# Patient Record
Sex: FEMALE | Race: WHITE | NOT HISPANIC OR LATINO | Employment: FULL TIME | URBAN - METROPOLITAN AREA
[De-identification: names, ages, dates, MRNs, and addresses within clinical notes are randomized per-mention and may not be internally consistent; named-entity substitution may affect disease eponyms.]

---

## 2020-07-04 ENCOUNTER — HOSPITAL ENCOUNTER (EMERGENCY)
Facility: CLINIC | Age: 50
Discharge: HOME | End: 2020-07-04
Attending: PHYSICIAN ASSISTANT | Admitting: PHYSICIAN ASSISTANT
Payer: COMMERCIAL

## 2020-07-04 VITALS
OXYGEN SATURATION: 100 % | SYSTOLIC BLOOD PRESSURE: 154 MMHG | RESPIRATION RATE: 20 BRPM | TEMPERATURE: 98.4 F | DIASTOLIC BLOOD PRESSURE: 110 MMHG

## 2020-07-04 DIAGNOSIS — T23.209A SUPERFICIAL PARTIAL THICKNESS BURN OF HAND: ICD-10-CM

## 2020-07-04 PROCEDURE — 16020 DRESS/DEBRID P-THICK BURN S: CPT

## 2020-07-04 PROCEDURE — 25000132 ZZH RX MED GY IP 250 OP 250 PS 637: Performed by: PHYSICIAN ASSISTANT

## 2020-07-04 PROCEDURE — 99283 EMERGENCY DEPT VISIT LOW MDM: CPT | Mod: 25

## 2020-07-04 RX ORDER — IBUPROFEN 600 MG/1
600 TABLET, FILM COATED ORAL ONCE
Status: COMPLETED | OUTPATIENT
Start: 2020-07-04 | End: 2020-07-04

## 2020-07-04 RX ORDER — HYDROCODONE BITARTRATE AND ACETAMINOPHEN 5; 325 MG/1; MG/1
1 TABLET ORAL EVERY 6 HOURS PRN
Qty: 8 TABLET | Refills: 0 | Status: SHIPPED | OUTPATIENT
Start: 2020-07-04 | End: 2020-07-07

## 2020-07-04 RX ORDER — HYDROCODONE BITARTRATE AND ACETAMINOPHEN 5; 325 MG/1; MG/1
1 TABLET ORAL ONCE
Status: COMPLETED | OUTPATIENT
Start: 2020-07-04 | End: 2020-07-04

## 2020-07-04 RX ADMIN — IBUPROFEN 600 MG: 600 TABLET ORAL at 17:04

## 2020-07-04 RX ADMIN — HYDROCODONE BITARTRATE AND ACETAMINOPHEN 1 TABLET: 5; 325 TABLET ORAL at 17:04

## 2020-07-04 ASSESSMENT — ENCOUNTER SYMPTOMS: WOUND: 1

## 2020-07-04 NOTE — ED PROVIDER NOTES
History     Chief Complaint:  Burn    HPI   Daphnie Sow is a 49 year old female who presents for evaluation of a burn. Patient states that yesterday she was holding a pot of boiling water when the pot slipped and poured hot water over her left arm, wrist, and hand. She put antiseptic ointment on the burn but states the burn continues to swell and blister. She has tried ibuprofen to no relief of her pain.  She reports normal sensation.    Allergies:  Nicotine     Medications:    Citalopram     Past Medical History:    Anxiety  Tobacco use disorder    Past Surgical History:    Hysterectomy  Appendectomy  Tonsillectomy and adenoidectomy  Cyst removal  Nasal septum surgery  Mandible surgery    Family History:    Alcohol abuse - father  Heart disease - father  Migraines - mother    Social History:  Smoking status: everyday smoker  Alcohol use: yes  Drug use: no  The patient presents to the emergency department by herself.  PCP: [facility], [place]  Marital Status:       Review of Systems   Skin: Positive for wound.   All other systems reviewed and are negative.    Physical Exam     Patient Vitals for the past 24 hrs:   BP Temp Temp src Heart Rate Resp SpO2   07/04/20 1655 (!) 154/110 98.4  F (36.9  C) Oral 88 20 100 %     Physical Exam  General: Alert and cooperative with exam. Resting comfortably on gurney  Head:  Scalp is NC/AT  Eyes:  No scleral icterus, PERRL with normal tracking  ENT:  The external nose and ears are normal.   Neck:  Normal range of motion without rigidity.  Chest:  Normal effort.  No tachypnea or distress.  Skin:  There is large ruptured blister as shown below with several other smaller ruptured blisters consistent with superficial partial-thickness burn of the left hand and distal forearm with larger area of surrounding first-degree burn.  Approximately 2% total BSA.  Skin blanches normally.  No circumferential burns.  Neuro: No gross motor deficits.  Normal sensation  throughout hand and arm.  Normal strength in hand and fingers throughout radial, median, ulnar nerve distributions.    No facial asymmetry.  GCS: 15.   Psych: Awake. Alert. Normal affect. Appropriate interactions.                  Emergency Department Course   Interventions:  170 ibuprofen 600 mg Oral  170 Norco 1 tablet Oral    Emergency Department Course:  Nursing notes and vitals reviewed. () I performed an exam of the patient as documented above.     Medicine administered as documented above.     Findings and plan explained to the Patient. Patient discharged home with instructions regarding supportive care, medications, and reasons to return. The importance of close follow-up was reviewed. The patient was prescribed Norco.    Impression & Plan      Medical Decision Makin-year-old female presents with burn to left hand and forearm from pot of water yesterday.  History and records reviewed.  On exam there is an area of superficial partial-thickness burn with larger area of first-degree burn.  No circumferential burns.  No indication for referral to burn center.  No signs of infection.  CMS intact distally.  Tetanus is up-to-date.  Plan will be pain control,  bacitracin and dressings, follow-up with PCP or burn center in 2 to 3 days for wound recheck.  Discussed watch for signs of infection and these were provided in writing.    Diagnosis:    ICD-10-CM    1. Superficial partial thickness burn of hand  T23.209A        Disposition:  discharged to home    Discharge Medications:  New Prescriptions    HYDROCODONE-ACETAMINOPHEN (NORCO) 5-325 MG TABLET    Take 1 tablet by mouth every 6 hours as needed for severe pain       Cameron Angeles  2020   [facility] EMERGENCY DEPARTMENT  Scribe Disclosure:  I, Cameron Angeles, am serving as a scribe at 4:51 PM on 2020 to document services personally performed by Adrien Tomas PA based on my observations and the provider's statements to me.         Adrien Tomas PA-C  07/04/20 1736

## 2020-07-04 NOTE — ED TRIAGE NOTES
Left wrist area burn from pouring boiling water on it while making a pasta salad yesterday.  Patient alert and oriented x3.  Airway, breathing and circulation intact.

## 2020-07-04 NOTE — ED AVS SNAPSHOT
[facility] Emergency Department  [address]  [facility] [address]  Phone:  [phone]  Fax:  [phone]                                    Daphnie Sow   MRN: [id number]    Department:  [facility] Emergency Department   Date of Visit:  7/4/2020           After Visit Summary Signature Page    I have received my discharge instructions, and my questions have been answered. I have discussed any challenges I see with this plan with the nurse or doctor.    ..........................................................................................................................................  Patient/Patient Representative Signature      ..........................................................................................................................................  Patient Representative Print Name and Relationship to Patient    ..................................................               ................................................  Date                                   Time    ..........................................................................................................................................  Reviewed by Signature/Title    ...................................................              ..............................................  Date                                               Time          22EPIC Rev 08/18

## 2022-07-31 ENCOUNTER — HOSPITAL ENCOUNTER (EMERGENCY)
Facility: CLINIC | Age: 52
Discharge: HOME | End: 2022-07-31
Attending: EMERGENCY MEDICINE | Admitting: EMERGENCY MEDICINE
Payer: COMMERCIAL

## 2022-07-31 VITALS
SYSTOLIC BLOOD PRESSURE: 144 MMHG | HEART RATE: 88 BPM | RESPIRATION RATE: 20 BRPM | TEMPERATURE: 98.2 F | OXYGEN SATURATION: 95 % | DIASTOLIC BLOOD PRESSURE: 95 MMHG

## 2022-07-31 DIAGNOSIS — S91.311A FOOT LACERATION, RIGHT, INITIAL ENCOUNTER: ICD-10-CM

## 2022-07-31 PROCEDURE — 12001 RPR S/N/AX/GEN/TRNK 2.5CM/<: CPT

## 2022-07-31 PROCEDURE — 250N000009 HC RX 250: Performed by: EMERGENCY MEDICINE

## 2022-07-31 PROCEDURE — 99283 EMERGENCY DEPT VISIT LOW MDM: CPT

## 2022-07-31 RX ADMIN — Medication: at 00:15

## 2022-07-31 ASSESSMENT — ENCOUNTER SYMPTOMS: WOUND: 1

## 2022-07-31 NOTE — ED TRIAGE NOTES
Pt arrives to ED with R foot lac from cutting it on metal siding. Pt is intoxicated. Tetanus 5 years ago.  washed out wound and tried to use butterfly strips to keep it together. CMS intact

## 2022-07-31 NOTE — ED PROVIDER NOTES
History     Chief Complaint:  Laceration     HPI   Daphnie Sow is a 51 year old female who presents with laceration. The patient reports that she cut her right foot on steel siding. Her  washed it with hydrogen peroxide and attempted to apply butterfly strips. She is having pain and is visibly uncomfortable. Last Tdap in 7/2017 per [facility].    Review of Systems   Skin: Positive for wound.   All other systems reviewed and are negative.    Allergies:  No known drug allergies     Medications:  Citalopram  Buspirone    Past Medical History:     Generalized anxiety disorder    Past Surgical History:    Hysterectomy  Appendectomy  Tonsillectomy  Adenoidectomy  Nasal septum surgery  Mandible surgery  Excision of right breast cyst     Family History:    Father- alcohol abuse, heart disease  Mother- migraines    Social History:  The patient presents to the ED with   PCP: Radhika Sandhu MD    Physical Exam     Patient Vitals for the past 24 hrs:   BP Temp Temp src Pulse Resp SpO2   07/31/22 0006 144/95 98.2  F (36.8  C) Temporal 88 20 95 %     Physical Exam  Nursing note and vitals reviewed.  Constitutional: Cooperative.   Cardiovascular: Normal rate, regular rhythm. 2+ right DP pulse.  Pulmonary/Chest: Effort normal  Musculoskeletal: Normal range of motion of RLE.   Neurological: Alert. Strength and sensation in RLE normal.   Skin: Skin is warm and dry. No rash noted. Laceration to the dorsum of the right foot.   Psychiatric: Normal mood and affect.     Emergency Department Course     Procedures    Laceration Repair      Procedure: Laceration Repair    Indication: Laceration    Consent: Verbal    Location: Right Foot     Length: 2.4 cm    Preparation: Irrigation with Sterile Saline.    Anesthesia/Sedation: Topical -LET and Lidocaine with Epinephrine - 1%      Treatment/Exploration: Wound explored, no foreign bodies found     Closure: The wound was closed with one layer. Skin/superficial layer was  closed with 5 x 4-0 Nylon using Interrupted sutures.     Patient Status: The patient tolerated the procedure well: Yes. There were no complications.    Reviewed:  I reviewed nursing notes, vitals, past medical history, Care Everywhere and [facility]    Assessments:  0023 I obtained history and examined the patient as noted above.   0105 Laceration repair.     Interventions:  Medications   lido-EPINEPHrine-tetracaine (LET) topical gel GEL ( Topical Given 7/31/22 0015)     Disposition:  The patient was discharged to home.     Impression & Plan     Medical Decision Making:  Daphnie Sow is a 51 year old female who presents for evaluation of a laceration to the right foot.  The wound was carefully evaluated and explored.  The laceration was closed with sutures as noted above.  There is no evidence of muscular, tendon, or bony damage with this laceration.  No signs of foreign body.  Possible complications (infection, scarring) were reviewed with the patient.  Follow up with primary care as noted in the discharge section.     Diagnosis:    ICD-10-CM    1. Foot laceration, right, initial encounter  S91.311A      Scribe Disclosure:  I, Jasmine Wong, am serving as a scribe at 12:32 AM on 7/31/2022 to document services personally performed by Esequiel Rojas MD based on my observations and the provider's statements to me.            Esequiel Rojas MD  07/31/22 0133

## 2022-11-24 ENCOUNTER — APPOINTMENT (OUTPATIENT)
Dept: CT IMAGING | Facility: CLINIC | Age: 52
End: 2022-11-24
Attending: EMERGENCY MEDICINE
Payer: COMMERCIAL

## 2022-11-24 ENCOUNTER — HOSPITAL ENCOUNTER (EMERGENCY)
Facility: CLINIC | Age: 52
Discharge: HOME | End: 2022-11-24
Attending: EMERGENCY MEDICINE | Admitting: EMERGENCY MEDICINE
Payer: COMMERCIAL

## 2022-11-24 VITALS
SYSTOLIC BLOOD PRESSURE: 130 MMHG | HEART RATE: 91 BPM | OXYGEN SATURATION: 95 % | TEMPERATURE: 97.8 F | RESPIRATION RATE: 16 BRPM | DIASTOLIC BLOOD PRESSURE: 79 MMHG

## 2022-11-24 DIAGNOSIS — S01.21XA LACERATION OF NOSE, INITIAL ENCOUNTER: ICD-10-CM

## 2022-11-24 DIAGNOSIS — F10.920 ALCOHOLIC INTOXICATION WITHOUT COMPLICATION (H): ICD-10-CM
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
## 2022-11-24 DIAGNOSIS — E04.1 THYROID NODULE: ICD-10-CM

## 2022-11-24 DIAGNOSIS — S01.81XA FACIAL LACERATION, INITIAL ENCOUNTER: ICD-10-CM

## 2022-11-24 LAB
ANION GAP SERPL CALCULATED.3IONS-SCNC: 14 MMOL/L (ref 7–15)
BASOPHILS # BLD AUTO: 0.1 10E3/UL (ref 0–0.2)
BASOPHILS NFR BLD AUTO: 1 %
BUN SERPL-MCNC: 6.9 MG/DL (ref 6–20)
CALCIUM SERPL-MCNC: 9.3 MG/DL (ref 8.6–10)
CHLORIDE SERPL-SCNC: 110 MMOL/L (ref 98–107)
CREAT SERPL-MCNC: 0.52 MG/DL (ref 0.51–0.95)
DEPRECATED HCO3 PLAS-SCNC: 22 MMOL/L (ref 22–29)
EOSINOPHIL # BLD AUTO: 0.1 10E3/UL (ref 0–0.7)
EOSINOPHIL NFR BLD AUTO: 1 %
ERYTHROCYTE [DISTWIDTH] IN BLOOD BY AUTOMATED COUNT: 12 % (ref 10–15)
ETHANOL SERPL-MCNC: 0.3 G/DL
GFR SERPL CREATININE-BSD FRML MDRD: >90 ML/MIN/1.73M2
GLUCOSE SERPL-MCNC: 105 MG/DL (ref 70–99)
HCT VFR BLD AUTO: 46.5 % (ref 35–47)
HGB BLD-MCNC: 15.2 G/DL (ref 11.7–15.7)
IMM GRANULOCYTES # BLD: 0 10E3/UL
IMM GRANULOCYTES NFR BLD: 0 %
LYMPHOCYTES # BLD AUTO: 2.4 10E3/UL (ref 0.8–5.3)
LYMPHOCYTES NFR BLD AUTO: 28 %
MCH RBC QN AUTO: 33.2 PG (ref 26.5–33)
MCHC RBC AUTO-ENTMCNC: 32.7 G/DL (ref 31.5–36.5)
MCV RBC AUTO: 102 FL (ref 78–100)
MONOCYTES # BLD AUTO: 0.8 10E3/UL (ref 0–1.3)
MONOCYTES NFR BLD AUTO: 10 %
NEUTROPHILS # BLD AUTO: 5 10E3/UL (ref 1.6–8.3)
NEUTROPHILS NFR BLD AUTO: 60 %
NRBC # BLD AUTO: 0 10E3/UL
NRBC BLD AUTO-RTO: 0 /100
PLATELET # BLD AUTO: 211 10E3/UL (ref 150–450)
POTASSIUM SERPL-SCNC: 4 MMOL/L (ref 3.4–5.3)
RBC # BLD AUTO: 4.58 10E6/UL (ref 3.8–5.2)
SODIUM SERPL-SCNC: 146 MMOL/L (ref 136–145)
WBC # BLD AUTO: 8.4 10E3/UL (ref 4–11)

## 2022-11-24 PROCEDURE — 36415 COLL VENOUS BLD VENIPUNCTURE: CPT | Performed by: EMERGENCY MEDICINE

## 2022-11-24 PROCEDURE — 70450 CT HEAD/BRAIN W/O DYE: CPT

## 2022-11-24 PROCEDURE — 99285 EMERGENCY DEPT VISIT HI MDM: CPT | Mod: 25

## 2022-11-24 PROCEDURE — 12004 RPR S/N/AX/GEN/TRK7.6-12.5CM: CPT

## 2022-11-24 PROCEDURE — 72125 CT NECK SPINE W/O DYE: CPT

## 2022-11-24 PROCEDURE — 70486 CT MAXILLOFACIAL W/O DYE: CPT

## 2022-11-24 PROCEDURE — 85025 COMPLETE CBC W/AUTO DIFF WBC: CPT | Performed by: EMERGENCY MEDICINE

## 2022-11-24 PROCEDURE — 82077 ASSAY SPEC XCP UR&BREATH IA: CPT | Performed by: EMERGENCY MEDICINE

## 2022-11-24 PROCEDURE — 80048 BASIC METABOLIC PNL TOTAL CA: CPT | Performed by: EMERGENCY MEDICINE

## 2022-11-24 ASSESSMENT — ACTIVITIES OF DAILY LIVING (ADL)
ADLS_ACUITY_SCORE: 35
ADLS_ACUITY_SCORE: 33

## 2022-11-24 ASSESSMENT — ENCOUNTER SYMPTOMS
NAUSEA: 0
VOMITING: 0
WOUND: 1
HEADACHES: 0

## 2022-11-24 NOTE — ED TRIAGE NOTES
Dancing to giovanni randall  on floor with fluffy socks. Fell on floor. Denies anticoagulants or LOC. Large lac to forehead and small lac to bridge of nose.  Attempted to order CT scan for pt and pt refused.

## 2022-11-24 NOTE — ED PROVIDER NOTES
History   Chief Complaint:  Fall    The history is provided by the patient and the spouse. The history is limited by the condition of the patient.      Daphnie Sow is a 52 year old female with history of BRANDYN who presents s/p a fall.  Patient states she sustained a wound to her forehead  while dancing on a hardwood floor while wearing socks. Denies chest pain, headache, vision change, neck pain, LOC, nausea, vomiting, and other symptoms.  She drank 3 glasses of wine tonight. Her last tetanus shot was 2017.  accompanying her.  No reported seizure activity. No history anticoagulation.     Review of Systems   Eyes: Negative for visual disturbance.   Cardiovascular: Negative for chest pain.   Gastrointestinal: Negative for nausea and vomiting.   Skin: Positive for wound.   Neurological: Negative for syncope and headaches.   All other systems reviewed and are negative.    Allergies:  No known drug allergies      Medications:  Citalopram  Buspirone     Past Medical History:     Generalized anxiety disorder     Past Surgical History:    Hysterectomy  Appendectomy  Tonsillectomy  Adenoidectomy  Nasal septum surgery  Mandible surgery  Excision of right breast cyst     Family History:    Father- alcohol abuse, heart disease  Mother- migraines    Social History:  The patient presents to the ED with    PCP: [facility], [place]     Physical Exam     Patient Vitals for the past 24 hrs:   BP Temp Temp src Pulse Resp SpO2   11/24/22 0330 111/73 -- -- 83 -- 95 %   11/24/22 0314 112/76 -- -- 84 -- 96 %   11/24/22 0259 123/76 -- -- 88 -- 97 %   11/24/22 0212 (!) 134/110 97.8  F (36.6  C) Temporal 99 16 100 %       Physical Exam  General: Alert. Appears comfortable  Head:  The scalp iw without trauma; 8cm full thickness laceration of L. Frontal region, no bony/arterial involvement. No FB visualized.   Eyes:  Sclera white; Pupils are equal and round. EOMI. No zygomatic arch tenderness  ENT:    External ears  normal.  No hemotympanum.      External nares normal. 1cm partial thickness laceration overlying nasal bridge; No septal hematoma, deviated septum (baseline per patient).    Neck:  No midline tenderness or pain with full ROM.  CV:  Rate as above with regular rhythm  2/2 radial and dorsal pedal pulses  Resp:  Breath sounds clear and equal bilaterally    Non-labored, no retractions or accessory muscle use  GI:  Abdomen soft, non-tender, non-distended    No rebound tenderness or guarding  MSK:  No midline tenderness or bony step-off    No deformity    Moves all extremities equally and symmetrically  Skin:  No rash or lesions noted.  Neuro:   No apparent deficit.    Strength 5/5 x4.  Sensation intact x4.     GCS: 14  Psych:  Slurring words, cooperative. Appears intoxicated      Emergency Department Course     Imaging:  Cervical spine CT w/o contrast   Final Result   IMPRESSION:   HEAD CT:   1.  Frontal scalp contusion and laceration. No acute intracranial hemorrhage or calvarial fracture.      FACIAL BONE CT:   1.  No facial fracture.      CERVICAL SPINE CT:   1.  No fracture or posttraumatic subluxation.   2.  9 mm right thyroid nodule.      REFERENCE:   Carson SANDERSON et al. Managing Incidental Thyroid Nodules Detected on Imaging: White Paper of the ACR Incidental Thyroid Findings Committee. JACR 2015; 12:143-150.      Incidental thyroid nodule detected on CT or MRI without suspicious findings. Applies to general population without limited life expectancy or significant comorbidities.      Age greater than or equal to 35 years   Less than 1.5 cm: No further evaluation.   Greater than or equal to 1.5 cm: Evaluate with thyroid ultrasound.      CT Facial Bones without Contrast   Final Result   IMPRESSION:   HEAD CT:   1.  Frontal scalp contusion and laceration. No acute intracranial hemorrhage or calvarial fracture.      FACIAL BONE CT:   1.  No facial fracture.      CERVICAL SPINE CT:   1.  No fracture or posttraumatic  subluxation.   2.  9 mm right thyroid nodule.      REFERENCE:   Carson OBREGONK et al. Managing Incidental Thyroid Nodules Detected on Imaging: White Paper of the ACR Incidental Thyroid Findings Committee. JACR 2015; 12:143-150.      Incidental thyroid nodule detected on CT or MRI without suspicious findings. Applies to general population without limited life expectancy or significant comorbidities.      Age greater than or equal to 35 years   Less than 1.5 cm: No further evaluation.   Greater than or equal to 1.5 cm: Evaluate with thyroid ultrasound.      Head CT w/o contrast   Final Result   IMPRESSION:   HEAD CT:   1.  Frontal scalp contusion and laceration. No acute intracranial hemorrhage or calvarial fracture.      FACIAL BONE CT:   1.  No facial fracture.      CERVICAL SPINE CT:   1.  No fracture or posttraumatic subluxation.   2.  9 mm right thyroid nodule.      REFERENCE:   Carson JK et al. Managing Incidental Thyroid Nodules Detected on Imaging: White Paper of the ACR Incidental Thyroid Findings Committee. JACR 2015; 12:143-150.      Incidental thyroid nodule detected on CT or MRI without suspicious findings. Applies to general population without limited life expectancy or significant comorbidities.      Age greater than or equal to 35 years   Less than 1.5 cm: No further evaluation.   Greater than or equal to 1.5 cm: Evaluate with thyroid ultrasound.        Report per radiology    Laboratory:  Labs Ordered and Resulted from Time of ED Arrival to Time of ED Departure   BASIC METABOLIC PANEL - Abnormal       Result Value    Sodium 146 (*)     Potassium 4.0      Chloride 110 (*)     Carbon Dioxide (CO2) 22      Anion Gap 14      Urea Nitrogen 6.9      Creatinine 0.52      Calcium 9.3      Glucose 105 (*)     GFR Estimate >90     ETHYL ALCOHOL LEVEL - Abnormal    Alcohol ethyl 0.30 (*)    CBC WITH PLATELETS AND DIFFERENTIAL - Abnormal    WBC Count 8.4      RBC Count 4.58      Hemoglobin 15.2      Hematocrit 46.5        (*)     MCH 33.2 (*)     MCHC 32.7      RDW 12.0      Platelet Count 211      % Neutrophils 60      % Lymphocytes 28      % Monocytes 10      % Eosinophils 1      % Basophils 1      % Immature Granulocytes 0      NRBCs per 100 WBC 0      Absolute Neutrophils 5.0      Absolute Lymphocytes 2.4      Absolute Monocytes 0.8      Absolute Eosinophils 0.1      Absolute Basophils 0.1      Absolute Immature Granulocytes 0.0      Absolute NRBCs 0.0          Procedures     Laceration Repair      LACERATION:  A full thickness 7 cm laceration.    LOCATION:  Frontal scalp    FUNCTION:  Distally sensation/motor/arterial are intact.    ANESTHESIA:  Lidocaine 1% with epi; 3cc    PREPARATION:  Jacob clens, irrigation                            DEBRIDEMENT:  No debridement. Wound explored.       CLOSURE:  Wound was closed with 10 x 5.0 FAST ABSORBING GUT sutures.       Laceration Repair      LACERATION:  A simple 1 cm laceration.    LOCATION:  Nasal bridge    FUNCTION:  Distally sensation/motor/arterial are intact.    ANESTHESIA:  none    PREPARATION:  Jacob clens, irrigation    DEBRIDEMENT: none.    CLOSURE:  Wound was closed with dermabond      Emergency Department Course:  Reviewed:  I reviewed nursing notes, vitals, past medical history and Care Everywhere    Assessments:  0348 I obtained history and examined the patient as noted above.    I rechecked the patient and explained findings.   0530   I spoke to patient's mother Mar regarding patient presentation.  We discussed daughter is still clinically intoxicated though plans to take Uber ride home with her spouse. We reviewed closed head injury precautions as well as wound precautions. Mar is currently at patient's home.    Interventions:  Medications - No data to display      Disposition:  Discharged    Impression & Plan   Medical Decision Making:  Patient is a 53 yo female presenting s/p a reported fall.  She is acutely intoxicated on arrival with a large frontal scalp  "laceration that was repaired as noted herein.  Risks of scarring/infection reviewed with patient/spouse and patient mother over phone given clinical intoxication of patient.  Also noted to have a small nasal bridge laceration which was repaired with dermabond. Tetanus UTD.  Patient underwent CT head/C-spine and face which fortunately are without traumatic injury. Incidental thyroid nodule identified and discussed with patient/spouse and patient's mother. No reported syncope, chest pain, seizure or other concerning symptoms reported.  Strong suspicion fall was likely 2/2 to alcohol intoxication.  Patient without other areas of injury on exam and I do not feel further emergent workup is warranted. She is ambulatory and requesting discharge. Given still clinically intoxicated, contacted patient's mother Mar over the phone.  She contracts for patient's safety and we reviewed patient to be managed as sustaining a closed head injury. Discussed secondary impact syndrome as well as \"red flags\" which would necessitate return to the ED.  Discussed wound precauations and need for PCP f/u in 1 week for wound reevaluation. Tylenol/motrin PRN.      Diagnosis:    ICD-10-CM    1. Facial laceration, initial encounter  S01.81XA       2. Laceration of nose, initial encounter  S01.21XA       3. Fall, initial encounter  W19.XXXA       4. Alcoholic intoxication without complication (H)  F10.920       5. Thyroid nodule  E04.1           Discharge Medications:  New Prescriptions    No medications on file       Scribe Disclosure:  José ENRIQUEZ, am serving as a scribe at 3:48 AM on 11/24/2022 to document services personally performed by Marlene Rodríguez DO based on my observations and the provider's statements to me.         Marlene Rodríguez DO  11/24/22 2052    "

## 2022-12-07 ENCOUNTER — DOCUMENTATION ONLY (OUTPATIENT)
Dept: HOME HEALTH SERVICES | Facility: CLINIC | Age: 52
End: 2022-12-07

## 2022-12-30 ENCOUNTER — DOCUMENTATION ONLY (OUTPATIENT)
Dept: HOME HEALTH SERVICES | Facility: CLINIC | Age: 52
End: 2022-12-30

## 2023-04-01 ENCOUNTER — HEALTH MAINTENANCE LETTER (OUTPATIENT)
Age: 53
End: 2023-04-01

## 2024-06-02 ENCOUNTER — HEALTH MAINTENANCE LETTER (OUTPATIENT)
Age: 54
End: 2024-06-02

## 2024-10-05 ENCOUNTER — APPOINTMENT (OUTPATIENT)
Dept: CT IMAGING | Facility: CLINIC | Age: 54
End: 2024-10-05
Attending: EMERGENCY MEDICINE
Payer: COMMERCIAL

## 2024-10-05 ENCOUNTER — HOSPITAL ENCOUNTER (EMERGENCY)
Facility: CLINIC | Age: 54
Discharge: HOME | End: 2024-10-05
Attending: EMERGENCY MEDICINE | Admitting: EMERGENCY MEDICINE
Payer: COMMERCIAL

## 2024-10-05 VITALS
TEMPERATURE: 98 F | WEIGHT: 140 LBS | BODY MASS INDEX: 23.32 KG/M2 | OXYGEN SATURATION: 99 % | HEIGHT: 65 IN | SYSTOLIC BLOOD PRESSURE: 148 MMHG | DIASTOLIC BLOOD PRESSURE: 92 MMHG | RESPIRATION RATE: 18 BRPM | HEART RATE: 71 BPM

## 2024-10-05 DIAGNOSIS — R42 VERTIGO: ICD-10-CM

## 2024-10-05 LAB
ANION GAP SERPL CALCULATED.3IONS-SCNC: 17 MMOL/L (ref 7–15)
APTT PPP: 25 SECONDS (ref 22–38)
BASOPHILS # BLD AUTO: 0 10E3/UL (ref 0–0.2)
BASOPHILS NFR BLD AUTO: 0 %
BUN SERPL-MCNC: 9.9 MG/DL (ref 6–20)
CALCIUM SERPL-MCNC: 9.4 MG/DL (ref 8.8–10.4)
CHLORIDE SERPL-SCNC: 100 MMOL/L (ref 98–107)
CREAT SERPL-MCNC: 0.52 MG/DL (ref 0.51–0.95)
EGFRCR SERPLBLD CKD-EPI 2021: >90 ML/MIN/1.73M2
EOSINOPHIL # BLD AUTO: 0 10E3/UL (ref 0–0.7)
EOSINOPHIL NFR BLD AUTO: 0 %
ERYTHROCYTE [DISTWIDTH] IN BLOOD BY AUTOMATED COUNT: 11.8 % (ref 10–15)
GLUCOSE BLDC GLUCOMTR-MCNC: 158 MG/DL (ref 70–99)
GLUCOSE SERPL-MCNC: 151 MG/DL (ref 70–99)
HCG SERPL QL: NEGATIVE
HCO3 SERPL-SCNC: 19 MMOL/L (ref 22–29)
HCT VFR BLD AUTO: 45.6 % (ref 35–47)
HGB BLD-MCNC: 15.4 G/DL (ref 11.7–15.7)
HOLD SPECIMEN: NORMAL
HOLD SPECIMEN: NORMAL
IMM GRANULOCYTES # BLD: 0.1 10E3/UL
IMM GRANULOCYTES NFR BLD: 0 %
INR PPP: 1.05 (ref 0.85–1.15)
LYMPHOCYTES # BLD AUTO: 3.7 10E3/UL (ref 0.8–5.3)
LYMPHOCYTES NFR BLD AUTO: 27 %
MCH RBC QN AUTO: 33 PG (ref 26.5–33)
MCHC RBC AUTO-ENTMCNC: 33.8 G/DL (ref 31.5–36.5)
MCV RBC AUTO: 98 FL (ref 78–100)
MONOCYTES # BLD AUTO: 1.2 10E3/UL (ref 0–1.3)
MONOCYTES NFR BLD AUTO: 9 %
NEUTROPHILS # BLD AUTO: 8.6 10E3/UL (ref 1.6–8.3)
NEUTROPHILS NFR BLD AUTO: 63 %
NRBC # BLD AUTO: 0 10E3/UL
NRBC BLD AUTO-RTO: 0 /100
PLATELET # BLD AUTO: 276 10E3/UL (ref 150–450)
POTASSIUM SERPL-SCNC: 3.8 MMOL/L (ref 3.4–5.3)
RBC # BLD AUTO: 4.66 10E6/UL (ref 3.8–5.2)
SODIUM SERPL-SCNC: 136 MMOL/L (ref 135–145)
TROPONIN T SERPL HS-MCNC: <6 NG/L
WBC # BLD AUTO: 13.7 10E3/UL (ref 4–11)

## 2024-10-05 PROCEDURE — 96374 THER/PROPH/DIAG INJ IV PUSH: CPT | Mod: 59

## 2024-10-05 PROCEDURE — 85025 COMPLETE CBC W/AUTO DIFF WBC: CPT | Performed by: EMERGENCY MEDICINE

## 2024-10-05 PROCEDURE — 70496 CT ANGIOGRAPHY HEAD: CPT

## 2024-10-05 PROCEDURE — 99291 CRITICAL CARE FIRST HOUR: CPT | Mod: 25

## 2024-10-05 PROCEDURE — 250N000009 HC RX 250: Performed by: EMERGENCY MEDICINE

## 2024-10-05 PROCEDURE — 250N000011 HC RX IP 250 OP 636: Performed by: EMERGENCY MEDICINE

## 2024-10-05 PROCEDURE — 82962 GLUCOSE BLOOD TEST: CPT

## 2024-10-05 PROCEDURE — G0508 CRIT CARE TELEHEA CONSULT 60: HCPCS | Mod: G0 | Performed by: STUDENT IN AN ORGANIZED HEALTH CARE EDUCATION/TRAINING PROGRAM

## 2024-10-05 PROCEDURE — 82310 ASSAY OF CALCIUM: CPT | Performed by: EMERGENCY MEDICINE

## 2024-10-05 PROCEDURE — 80048 BASIC METABOLIC PNL TOTAL CA: CPT | Performed by: EMERGENCY MEDICINE

## 2024-10-05 PROCEDURE — 36415 COLL VENOUS BLD VENIPUNCTURE: CPT | Performed by: EMERGENCY MEDICINE

## 2024-10-05 PROCEDURE — 84484 ASSAY OF TROPONIN QUANT: CPT | Performed by: EMERGENCY MEDICINE

## 2024-10-05 PROCEDURE — 84703 CHORIONIC GONADOTROPIN ASSAY: CPT | Performed by: EMERGENCY MEDICINE

## 2024-10-05 PROCEDURE — 85610 PROTHROMBIN TIME: CPT | Performed by: EMERGENCY MEDICINE

## 2024-10-05 PROCEDURE — 250N000013 HC RX MED GY IP 250 OP 250 PS 637: Performed by: EMERGENCY MEDICINE

## 2024-10-05 PROCEDURE — 96376 TX/PRO/DX INJ SAME DRUG ADON: CPT

## 2024-10-05 PROCEDURE — 70450 CT HEAD/BRAIN W/O DYE: CPT

## 2024-10-05 PROCEDURE — 93005 ELECTROCARDIOGRAM TRACING: CPT

## 2024-10-05 PROCEDURE — 85730 THROMBOPLASTIN TIME PARTIAL: CPT | Performed by: EMERGENCY MEDICINE

## 2024-10-05 RX ORDER — MECLIZINE HYDROCHLORIDE 25 MG/1
25 TABLET ORAL 3 TIMES DAILY PRN
Qty: 30 TABLET | Refills: 0 | Status: SHIPPED | OUTPATIENT
Start: 2024-10-05

## 2024-10-05 RX ORDER — ONDANSETRON 2 MG/ML
4 INJECTION INTRAMUSCULAR; INTRAVENOUS ONCE
Status: COMPLETED | OUTPATIENT
Start: 2024-10-05 | End: 2024-10-05

## 2024-10-05 RX ORDER — MECLIZINE HYDROCHLORIDE 25 MG/1
25 TABLET ORAL ONCE
Status: DISCONTINUED | OUTPATIENT
Start: 2024-10-05 | End: 2024-10-05

## 2024-10-05 RX ORDER — MECLIZINE HYDROCHLORIDE 25 MG/1
50 TABLET ORAL ONCE
Status: COMPLETED | OUTPATIENT
Start: 2024-10-05 | End: 2024-10-05

## 2024-10-05 RX ORDER — IOPAMIDOL 755 MG/ML
67 INJECTION, SOLUTION INTRAVASCULAR ONCE
Status: COMPLETED | OUTPATIENT
Start: 2024-10-05 | End: 2024-10-05

## 2024-10-05 RX ADMIN — ONDANSETRON 4 MG: 2 INJECTION INTRAMUSCULAR; INTRAVENOUS at 10:40

## 2024-10-05 RX ADMIN — MECLIZINE HYDROCHLORIDE 50 MG: 25 TABLET ORAL at 12:12

## 2024-10-05 RX ADMIN — IOPAMIDOL 67 ML: 755 INJECTION, SOLUTION INTRAVENOUS at 10:54

## 2024-10-05 RX ADMIN — ONDANSETRON 4 MG: 2 INJECTION INTRAMUSCULAR; INTRAVENOUS at 12:10

## 2024-10-05 RX ADMIN — SODIUM CHLORIDE 80 ML: 9 INJECTION, SOLUTION INTRAVENOUS at 10:55

## 2024-10-05 ASSESSMENT — ACTIVITIES OF DAILY LIVING (ADL)
ADLS_ACUITY_SCORE: 35

## 2024-10-05 ASSESSMENT — COLUMBIA-SUICIDE SEVERITY RATING SCALE - C-SSRS
6. HAVE YOU EVER DONE ANYTHING, STARTED TO DO ANYTHING, OR PREPARED TO DO ANYTHING TO END YOUR LIFE?: NO
1. IN THE PAST MONTH, HAVE YOU WISHED YOU WERE DEAD OR WISHED YOU COULD GO TO SLEEP AND NOT WAKE UP?: NO
2. HAVE YOU ACTUALLY HAD ANY THOUGHTS OF KILLING YOURSELF IN THE PAST MONTH?: NO

## 2024-10-05 NOTE — ED PROVIDER NOTES
"  Emergency Department Note      History of Present Illness     Chief Complaint   Stroke Symptoms      HPI   Daphnie Sow is a 53 year old female who presents to the ED with her  for evaluation of stroke-like symptoms. The patient states she started to experience bilateral ear pain and fullness a few days ago that has been worsening with time. Today, she had sudden bilateral gait unsteadiness, dizziness, nausea, and vomiting while doing laundry at around 0930.  Describes the dizziness as room-spinning and worse with head movement. Endorses a mild headache that she believes is due to vomiting. States she takes venlafaxine daily for anxiety. No missed doses or known adverse side effects. Denies speech or vision changes, unilateral weakness, chest pain, shortness of breath, fever, rhinorrhea, congestion, or recent falls or injuries. No history vertigo.     Independent Historian   None    Review of External Notes   None    Past Medical History     Medical History and Problem List   Anxiety  Insomnia  Deviated nasal septum  Tobacco use disorder    Medications   Venlafaxine    Surgical History   Tubal ligation  Lower jaw widening  Nasal septum repair  Appendectomy  Colporrhaphy  Hysterectomy     Physical Exam     Patient Vitals for the past 24 hrs:   BP Temp Pulse Resp SpO2 Height Weight   10/05/24 1316 (!) 148/92 -- 71 -- -- -- --   10/05/24 1301 (!) 148/97 -- 75 -- 99 % -- --   10/05/24 1240 (!) 147/93 -- 76 -- 97 % -- --   10/05/24 1225 (!) 152/96 -- 73 -- 97 % -- --   10/05/24 1210 (!) 154/100 -- 75 -- 98 % -- --   10/05/24 1155 (!) 153/95 -- 74 -- 96 % -- --   10/05/24 1147 (!) 160/107 -- 74 -- 98 % -- --   10/05/24 1115 -- -- -- -- -- 1.651 m (5' 5\") 63.5 kg (140 lb)   10/05/24 1106 (!) 181/108 -- 88 -- 96 % -- --   10/05/24 1037 (!) 184/121 98  F (36.7  C) 90 18 100 % 1.651 m (5' 5\") --     Physical Exam  General: Alert, sitting in wheelchair hunched over.   Neuro:    Alert  Speech is normal and " fluent.   Face is symmetric without droop. CN's II-XII grossly intact. Negative pronator drift. Finger to nose symmetric and grossly normal bilaterally.    Right Arm: Good  strength. 5/5 elbow flexion. 5/5 elbow extension. Sensation intact to light touch.   Left Arm: Good  strength. 5/5 elbow flexion. 5/5 elbow extension. Sensation intact to light touch.   Right Le/5 straight leg raise, 5/5 knee flexion, 5/5 knee extension. Sensation intact to light touch.   Left Le/5 straight leg raise, 5/5 knee flexion, 5/5 knee extension. Sensation intact to light touch.            Romberg and gait: Deferred  HEENT:  Moist mucous membranes.  Conjunctiva normal. TMs clear bilaterally  CV:  RRR, no m/r/g, skin warm and well perfused  Pulm:  CTAB, no wheezes/ronchi/rales.  No acute distress, breathing comfortably  GI:  Soft, nontender, nondistended.  No rebound or guarding.    MSK:  Moving all extremities.  No focal areas of edema, erythema  Skin:  WWP, no rashes, no lower extremity edema, skin color normal, no diaphoresis  Psych:  Well-appearing, normal affect, regular speech    Diagnostics     Lab Results   Labs Ordered and Resulted from Time of ED Arrival to Time of ED Departure   BASIC METABOLIC PANEL - Abnormal       Result Value    Sodium 136      Potassium 3.8      Chloride 100      Carbon Dioxide (CO2) 19 (*)     Anion Gap 17 (*)     Urea Nitrogen 9.9      Creatinine 0.52      GFR Estimate >90      Calcium 9.4      Glucose 151 (*)    CBC WITH PLATELETS AND DIFFERENTIAL - Abnormal    WBC Count 13.7 (*)     RBC Count 4.66      Hemoglobin 15.4      Hematocrit 45.6      MCV 98      MCH 33.0      MCHC 33.8      RDW 11.8      Platelet Count 276      % Neutrophils 63      % Lymphocytes 27      % Monocytes 9      % Eosinophils 0      % Basophils 0      % Immature Granulocytes 0      NRBCs per 100 WBC 0      Absolute Neutrophils 8.6 (*)     Absolute Lymphocytes 3.7      Absolute Monocytes 1.2      Absolute Eosinophils  0.0      Absolute Basophils 0.0      Absolute Immature Granulocytes 0.1      Absolute NRBCs 0.0     GLUCOSE BY METER - Abnormal    GLUCOSE BY METER POCT 158 (*)    INR - Normal    INR 1.05     PARTIAL THROMBOPLASTIN TIME - Normal    aPTT 25     TROPONIN T, HIGH SENSITIVITY - Normal    Troponin T, High Sensitivity <6     HCG QUALITATIVE PREGNANCY - Normal    hCG Serum Qualitative Negative         Imaging   CTA Head Neck with Contrast   Final Result   IMPRESSION:    HEAD CT:   1.  Normal head CT.      HEAD CTA:    1.  Normal CTA [place].      NECK CTA:   1.  Patent major cervical arteries. No large vessel occlusion or evidence of dissection.   2.  Minimal atherosclerotic plaque at the right carotid bifurcation. No measurable stenosis or focal plaque ulceration.   3.  Widely patent vertebral arteries and left carotid artery.      Findings were discussed with Dr. Bridges by myself at 11:10 AM on 10/5/2024.      CT Head w/o Contrast   Final Result   IMPRESSION:    HEAD CT:   1.  Normal head CT.      HEAD CTA:    1.  Normal CTA [place].      NECK CTA:   1.  Patent major cervical arteries. No large vessel occlusion or evidence of dissection.   2.  Minimal atherosclerotic plaque at the right carotid bifurcation. No measurable stenosis or focal plaque ulceration.   3.  Widely patent vertebral arteries and left carotid artery.      Findings were discussed with Dr. Bridges by myself at 11:10 AM on 10/5/2024.          EKG   ECG taken at 1131, ECG read at 1141  Sinus rhythm   No previous EKG to compare.  Rate 77 bpm. RI interval 132 ms. QRS duration 90 ms. QT/QTc 400/452 ms. P-R-T axes 49 16 16.    Independent Interpretation   None    ED Course      Medications Administered   Medications   ondansetron (ZOFRAN) injection 4 mg (4 mg Intravenous $Given 10/5/24 1040)   iopamidol (ISOVUE-370) solution 67 mL (67 mLs Intravenous $Given 10/5/24 1051)     And   sodium chloride 0.9 % bag for CT scan flush use (80 mLs As  instructed $Given 10/5/24 1055)   meclizine (ANTIVERT) tablet 50 mg (50 mg Oral $Given 10/5/24 1212)   ondansetron (ZOFRAN) injection 4 mg (4 mg Intravenous $Given 10/5/24 1210)       Procedures   Procedures     Discussion of Management   Stroke Neurology    ED Course   ED Course as of 10/05/24 1908   Sat Oct 05, 2024   1040 I obtained history and performed a physical exam as noted above.    1046 Tier 1 code stroke called.   1054 I spoke with Dr. Govea of Stroke Neurology regarding the patient's presentation and plan of care.   1112 I spoke with Radiology regarding the CT results.    1120 I spoke with Dr. Govea regarding CT results. Code stroke deescalated.    1258 I rechecked and updated the patient. She feels significantly improved and does not want an MRI.       Additional Documentation  None    Medical Decision Making / Diagnosis     CMS Diagnoses: None    MIPS       None    MDM   Daphnie Sow is a 53 year old female with history of anxiety presents to the ER with concerns for sudden onset dizziness with associated nausea/vomiting.  Patient was initially evaluated as neuro evaluation in triage.  On examination, patient has no appreciable strength or sensory deficit, cranial nerves appear intact.  However, given severity of her symptoms and timing of onset, tier 1 stroke code was called.  Initial imaging shows no significant pathology or signs of LVO.  After discussion with stroke neurology, stroke code was de-escalated, initial plan was to further evaluate with MRI.  Screening EKG shows no acute ischemic appearing changes or signs of worrisome dysrhythmia.  The rest of her basic lab studies above are largely unremarkable including normal troponin.  Nonspecific leukocytosis likely due to vomiting, there is no clear signs of infection or clinical signs worrisome for CNS infection.  Patient was given meclizine and Zofran with near resolution of her symptoms.  After discussion of initial workup and  CT imaging, given her significant improvement of symptoms, patient declined further evaluation with MRI.  She understands that I have not fully ruled out subtle ischemic stroke and she and her  understand this.  I feel deferring MRI imaging is otherwise reasonable as her symptoms were sudden in onset and worse with position suggestive of peripheral vertigo.  For the past few days, she is noted cyst ear fullness but there is no signs of otitis media, cerumen impaction, otitis externa or other worrisome ear pathology.  She denies any tinnitus or hearing loss.  She understands that the working diagnosis is vertigo, likely peripheral.  She requests discharge with meclizine which has significantly helped her symptoms here.  Recommended close follow-up with her primary care team regarding her presentation today and she is comfortable with this plan.  Discussed signs/symptoms that should prompt urgent return to the emergency department and all questions were answered prior to discharge.    Disposition   The patient was discharged.     Diagnosis     ICD-10-CM    1. Vertigo  R42            Discharge Medications   Discharge Medication List as of 10/5/2024  1:16 PM        START taking these medications    Details   meclizine (ANTIVERT) 25 MG tablet Take 1 tablet (25 mg) by mouth 3 times daily as needed for dizziness., Disp-30 tablet, R-0, E-Prescribe               Scribe Disclosure:  I, Shannon Rainey, am serving as a scribe at 10:48 AM on 10/5/2024 to document services personally performed by Castro Bridges MD based on my observations and the provider's statements to me.        Castro Bridges MD  10/05/24 [id number]

## 2024-10-05 NOTE — CONSULTS
"[facility]     Stroke Code Note          History of Present Illness     Chief Complaint: Stroke Symptoms    Daphnie Sow is a 53 year old female with no significant PMH who presented with sudden onset vertigo. She has been having ear fullness bilaterally for the past few days. Today when she was putting away laundry around 9:30 AM, she developed sudden onset room spinning sensation and vomiting that worsens with position and improving with rest. No focal deficits on my exam.          Past Medical History     Stroke risk factors: None  Preadmission antithrombotic regimen: None    Modified Mead Score (Pre-morbid)  0-No deficits                   Assessment and Plan       Acute onset vertigo - r/o stroke, likely peripheral     Intravenous Thrombolysis  Not given due to:   - minor/isolated/quickly resolving symptoms     Endovascular Treatment  Not initiated due to absence of proximal vessel occlusion     Plan:  Stat MRI Brain wwo contrast w/ coronal DWI and thin sections through brainstem  If MRI negative, treat symptomatically. If acute strokes noted, please call us back for further recommendations.      ___________________________________________________________________    Souleymane Govea MD       Department of Neurology       Securely message with the Vocera Web Console (learn more here)   To page me or covering stroke neurology team member, click here: AMCOM  Choose \"On Call\" tab at top, then select \"NEUROLOGY/ALL SITES\" from middle drop-down box, press Enter, then look for \"stroke\" or \"telestroke\" for your site.  ___________________________________________________________________        Imaging/Labs   (personally reviewed scans below)    CT head: No acute changes noted  CTA head/neck: No LVO or flow limiting stenosis         Physical Examination     BP: (!) 184/121   Pulse: 90   Resp: 18   Temp: 98  F (36.7  C)       SpO2: 100 %   O2 Device: None (Room " air)        Wt Readings from Last 2 Encounters:   No data found for Wt       Neuro Exam  Mental Status:  alert, oriented x 3, follows commands, speech clear and fluent, naming and repetition normal  Cranial Nerves:  visual fields intact (tested by nurse), EOMI with normal smooth pursuit, facial sensation intact and symmetric (tested by nurse), facial movements symmetric, hearing not formally tested but intact to conversation, no dysarthria, tongue protrusion midline  Motor:  no abnormal movements, able to move all limbs antigravity spontaneously with no signs of hemiparesis observed, no pronator drift  Reflexes:  unable to test (telestroke)  Sensory:  light touch sensation intact and symmetric throughout upper and lower extremities (assessed by nurse), no extinction on double simultaneous stimulation (assessed by nurse)  Coordination:  normal finger-to-nose and heel-to-shin bilaterally without dysmetria, rapid alternating movements symmetric  Station/Gait:  unable to test due to telestroke        Stroke Scales       NIHSS  1a. Level of Consciousness 0-->Alert, keenly responsive   1b. LOC Questions 0-->Answers both questions correctly   1c. LOC Commands 0-->Performs both tasks correctly   2.   Best Gaze 0-->Normal   3.   Visual 0-->No visual loss   4.   Facial Palsy 0-->Normal symmetrical movements   5a. Motor Arm, Left 0-->No drift, limb holds 90 (or 45) degrees for full 10 secs   5b. Motor Arm, Right 0-->No drift, limb holds 90 (or 45) degrees for full 10 secs   6a. Motor Leg, Left 0-->No drift, leg holds 30 degree position for full 5 secs   6b. Motor Leg, right 0-->No drift, leg holds 30 degree position for full 5 secs   7.   Limb Ataxia 0-->Absent   8.   Sensory 0-->Normal, no sensory loss   9.   Best Language 0-->No aphasia, normal   10. Dysarthria 0-->Normal   11. Extinction and Inattention  0-->No abnormality   Total 0 (10/05/24 1109)            Labs     CBC  Lab Results   Component Value Date    HGB 15.4  "10/05/2024    HCT 45.6 10/05/2024    WBC 13.7 (H) 10/05/2024     10/05/2024       BMP  Lab Results   Component Value Date     10/05/2024    POTASSIUM 3.8 10/05/2024    CHLORIDE 100 10/05/2024    CO2 19 (L) 10/05/2024    BUN 9.9 10/05/2024    CR 0.52 10/05/2024     (H) 10/05/2024     (H) 10/05/2024    MEME 9.4 10/05/2024       INR  No results found for: \"INR\"    Data   Stroke Code Data  (for stroke code with tele)  Stroke code activated 10/05/24  1047   First stroke provider response 10/05/24  1053   Video start time 10/05/24  1109   Video end time 10/05/24  1118   Last known normal 10/05/24  0930   Time of discovery (or onset of symptoms)  10/05/24  0930   Head CT read by Stroke Neuro Provider 10/05/24  1107   Was stroke code de-escalated? Yes  10/05/24  1120     Telestroke Service Details  Type of service telemedicine diagnostic assessment of acute neurological changes   Reason telemedicine is appropriate patient requires assessment with a specialist for diagnosis and treatment of neurological symptoms   Mode of transmission secure interactive audio and video communication per [place]   Originating site (patient location) [facility]    Distant site (provider location) Provider remote site        Time Spent on this Encounter   Billing: I personally examined and evaluated the patient today. At the time of my evaluation and management the patient was critical condition today due to sudden onset vertigo. Key decisions made today included decision regarding thrombolysis. I spent a total of 35 minutes providing critical care services, evaluating the patient, directing care and reviewing laboratory values and radiologic reports.    "

## 2024-10-05 NOTE — ED TRIAGE NOTES
"Pt arrives in wheelchair with  for stroke like symptoms and vertigo. Pt had plugged ears yesterday and again this morning, then reports sudden onset one hour ago of \"running into walls,\" inability to focus, nausea and vomiting and blurred vision. Takes anti anxiety otherwise no daily meds and no allergies.         "

## 2024-10-07 LAB
ATRIAL RATE - MUSE: 77 BPM
DIASTOLIC BLOOD PRESSURE - MUSE: NORMAL MMHG
INTERPRETATION ECG - MUSE: NORMAL
P AXIS - MUSE: 49 DEGREES
PR INTERVAL - MUSE: 132 MS
QRS DURATION - MUSE: 90 MS
QT - MUSE: 400 MS
QTC - MUSE: 452 MS
R AXIS - MUSE: 16 DEGREES
SYSTOLIC BLOOD PRESSURE - MUSE: NORMAL MMHG
T AXIS - MUSE: 16 DEGREES
VENTRICULAR RATE- MUSE: 77 BPM

## 2024-12-22 ENCOUNTER — HEALTH MAINTENANCE LETTER (OUTPATIENT)
Age: 54
End: 2024-12-22

## 2025-04-12 ENCOUNTER — HEALTH MAINTENANCE LETTER (OUTPATIENT)
Age: 55
End: 2025-04-12